# Patient Record
Sex: FEMALE | Race: WHITE | URBAN - METROPOLITAN AREA
[De-identification: names, ages, dates, MRNs, and addresses within clinical notes are randomized per-mention and may not be internally consistent; named-entity substitution may affect disease eponyms.]

---

## 2021-01-08 ENCOUNTER — OFFICE VISIT (OUTPATIENT)
Dept: URBAN - METROPOLITAN AREA CLINIC 91 | Facility: CLINIC | Age: 26
End: 2021-01-08

## 2021-01-08 DIAGNOSIS — H52.13 MYOPIA, BILATERAL: ICD-10-CM

## 2021-01-08 DIAGNOSIS — H04.123 DRY EYE SYNDROME OF BILATERAL LACRIMAL GLANDS: Primary | ICD-10-CM

## 2021-01-08 DIAGNOSIS — H02.882 MEIBOMIAN GLAND DYSFUNCTION RIGHT LOWER EYELID: ICD-10-CM

## 2021-01-08 DIAGNOSIS — H02.885 MEIBOMIAN GLAND DYSFUNCTION LEFT LOWER EYELID: ICD-10-CM

## 2021-01-08 PROCEDURE — 92002 INTRM OPH EXAM NEW PATIENT: CPT | Performed by: OPHTHALMOLOGY

## 2021-01-08 ASSESSMENT — INTRAOCULAR PRESSURE
OS: 13
OD: 15

## 2021-01-08 ASSESSMENT — VISUAL ACUITY: OD: 20/20

## 2021-01-08 NOTE — IMPRESSION/PLAN
Impression: Meibomian gland dysfunction left lower eyelid: H02.885. 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 The chronic nature of the condition and its association with evaporative dry eye was emphasized. The various etiologies were discussed to include: androgen deficiency, certain medications, CLW, the normal bacterial omaira mechanisms of contribution, rosacea and lifestyle. Recommended the patient do daily warm compresses and lid scrubs, Artificial tears qid and prn. A topical or oral antibiotic and lipiflow were discussed as additional treatment options. Also, discussed lipiflow as an option $750 Quoted. RV in 1 year for [facility] exam with Dr. Erin Garcia.